# Patient Record
Sex: FEMALE | Race: ASIAN | NOT HISPANIC OR LATINO | Employment: FULL TIME | URBAN - METROPOLITAN AREA
[De-identification: names, ages, dates, MRNs, and addresses within clinical notes are randomized per-mention and may not be internally consistent; named-entity substitution may affect disease eponyms.]

---

## 2017-01-13 ENCOUNTER — OFFICE VISIT (OUTPATIENT)
Dept: OBSTETRICS AND GYNECOLOGY | Facility: CLINIC | Age: 27
End: 2017-01-13
Payer: MEDICAID

## 2017-01-13 VITALS
HEIGHT: 61 IN | SYSTOLIC BLOOD PRESSURE: 108 MMHG | DIASTOLIC BLOOD PRESSURE: 64 MMHG | BODY MASS INDEX: 20.82 KG/M2 | WEIGHT: 110.25 LBS

## 2017-01-13 DIAGNOSIS — Z34.90 PREGNANCY, UNSPECIFIED GESTATIONAL AGE: ICD-10-CM

## 2017-01-13 DIAGNOSIS — Z32.01 PREGNANCY CONFIRMED BY POSITIVE URINE TEST: Primary | ICD-10-CM

## 2017-01-13 DIAGNOSIS — N91.2 AMENORRHEA: ICD-10-CM

## 2017-01-13 PROBLEM — O09.899 SHORT INTERVAL BETWEEN PREGNANCIES COMPLICATING PREGNANCY, ANTEPARTUM: Status: ACTIVE | Noted: 2017-01-13

## 2017-01-13 PROCEDURE — 99204 OFFICE O/P NEW MOD 45 MIN: CPT | Mod: S$PBB,TH,, | Performed by: OBSTETRICS & GYNECOLOGY

## 2017-01-13 PROCEDURE — 99999 PR PBB SHADOW E&M-EST. PATIENT-LVL II: CPT | Mod: PBBFAC,,, | Performed by: OBSTETRICS & GYNECOLOGY

## 2017-01-13 PROCEDURE — 87591 N.GONORRHOEAE DNA AMP PROB: CPT

## 2017-01-13 PROCEDURE — 99212 OFFICE O/P EST SF 10 MIN: CPT | Mod: PBBFAC | Performed by: OBSTETRICS & GYNECOLOGY

## 2017-01-13 NOTE — PROGRESS NOTES
Best Overall Assessment: 01/24/17 EDC: 08/25/17 Assessed GA: 9w4d      PT HERE LMP 11/18/16 AND + UPT. EGA=8 0/7 WEEKS.  STILL BREAST FEEDING AND HAD ONE CYCLE.  NOT TOO TIRED OR WIT N/V.    ROS:  GENERAL: No fever, chills, fatigability or weight loss.  VULVAR: No pain, no lesions and no itching.  VAGINAL: No relaxation, no itching, no discharge, no abnormal bleeding and no lesions.  ABDOMEN: No abdominal pain. Denies nausea. Denies vomiting. No diarrhea. No constipation  BREAST: Denies pain. No lumps. No discharge.  URINARY: No incontinence, no nocturia, no frequency and no dysuria.  CARDIOVASCULAR: No chest pain. No shortness of breath. No leg cramps.  NEUROLOGICAL: No headaches. No vision changes.  The remainder of the review of systems was negative.    PE:  General Appearance: normal weight And Well developed. Well nourished. In no acute distress.  Vulva: Lesions: No.  Urethral Meatus: Normal size. Normal location. No lesions. No prolapse.  Urethra: No masses. No tenderness. No prolapse. No scarring.  Bladder: No masses. No tenderness.  Vagina: Mucosa NI:yes discharge no, atrophy no, cystocele no or rectocele no.  Cervix: Lesion: no  Stenotic: no Cervical motion tenderness: no  Uterus: Uterus size: 6 weeks. Support fair and poor. Uterus size: Normal  Adnexa: Masses: No Tenderness: No CDS Nodularity: No  Abdomen: normal weight No masses. No tenderness.  CHEST: CTA B  HEART: RRR  EXT: NO EDEMA        PROCEDURES:    PLAN:     DIAGNOSIS:  1. Pregnancy confirmed by positive urine test    2. Pregnancy, unspecified gestational age    3. Amenorrhea        MEDICATIONS & ORDERS:  Orders Placed This Encounter    C. trachomatis/N. gonorrhoeae by AMP DNA Cervix    US OB/GYN Procedure ([facility])    US MFM Procedure ([facility])         FOLLOW-UP: With me in 1 month  LABS NEXT APPOINTMENT

## 2017-01-17 LAB
C TRACH DNA SPEC QL NAA+PROBE: NEGATIVE
N GONORRHOEA DNA SPEC QL NAA+PROBE: NEGATIVE

## 2017-01-24 ENCOUNTER — PROCEDURE VISIT (OUTPATIENT)
Dept: OBSTETRICS AND GYNECOLOGY | Facility: CLINIC | Age: 27
End: 2017-01-24
Payer: MEDICAID

## 2017-01-24 DIAGNOSIS — Z34.90 PREGNANCY, UNSPECIFIED GESTATIONAL AGE: ICD-10-CM

## 2017-01-24 PROCEDURE — 76801 OB US < 14 WKS SINGLE FETUS: CPT | Mod: 26,S$PBB,, | Performed by: OBSTETRICS & GYNECOLOGY

## 2017-01-24 PROCEDURE — 76801 OB US < 14 WKS SINGLE FETUS: CPT | Mod: PBBFAC | Performed by: OBSTETRICS & GYNECOLOGY

## 2017-02-17 ENCOUNTER — OFFICE VISIT (OUTPATIENT)
Dept: MATERNAL FETAL MEDICINE | Facility: CLINIC | Age: 27
End: 2017-02-17
Payer: MEDICAID

## 2017-02-17 ENCOUNTER — LAB VISIT (OUTPATIENT)
Dept: LAB | Facility: OTHER | Age: 27
End: 2017-02-17
Attending: OBSTETRICS & GYNECOLOGY
Payer: MEDICAID

## 2017-02-17 VITALS — WEIGHT: 112.63 LBS | BODY MASS INDEX: 21.29 KG/M2

## 2017-02-17 DIAGNOSIS — Z36.82 ENCOUNTER FOR (NT) NUCHAL TRANSLUCENCY SCAN: ICD-10-CM

## 2017-02-17 DIAGNOSIS — Z34.90 PREGNANCY, UNSPECIFIED GESTATIONAL AGE: ICD-10-CM

## 2017-02-17 DIAGNOSIS — Z36.89 ENCOUNTER FOR FETAL ANATOMIC SURVEY: Primary | ICD-10-CM

## 2017-02-17 PROCEDURE — 81508 FTL CGEN ABNOR TWO PROTEINS: CPT

## 2017-02-17 PROCEDURE — 76801 OB US < 14 WKS SINGLE FETUS: CPT | Mod: 26,S$PBB,, | Performed by: PEDIATRICS

## 2017-02-17 PROCEDURE — 76813 OB US NUCHAL MEAS 1 GEST: CPT | Mod: 26,S$PBB,, | Performed by: PEDIATRICS

## 2017-02-17 PROCEDURE — 99499 UNLISTED E&M SERVICE: CPT | Mod: S$PBB,,, | Performed by: PEDIATRICS

## 2017-02-17 PROCEDURE — 36415 COLL VENOUS BLD VENIPUNCTURE: CPT

## 2017-02-17 NOTE — PROGRESS NOTES
Indication: Nuchal Translucency (KENTRELL SMITH).   Maternal age (27 years).   ____________________________________________________________________________  History: Age: 27 years.  ____________________________________________________________________________  Dating:  LMP: 11/18/16 EDC: 08/25/17 GA by LMP: 13w0d  Current Scan on: 02/17/17 EDC: 08/27/17 GA by current scan: 12w5d  Best Overall Assessment: 02/17/17 EDC: 08/25/17 Assessed GA: 13w0d  The calculation of the gestational age by current scan was based on CRL.  The Best Overall Assessment is based on the LMP.  ____________________________________________________________________________  Maternal Characteristics and History:  Maternal age 27 years. Parity (pregnancies after 23 weeks). Current maternal weight 51.1 kg  ____________________________________________________________________________  First Trimester Scan:  Arreola gestation.    Biometry:  CRL 63.0 mm 22nd% 12w5d (12w1d to 13w1d)  NT 1.40 mm  Additional Markers for Aneuploidies: Nasal bone present.  Fetal Anatomy:  Skull / Brain: appears normal. Abdomen: appears normal. Stomach: visible. Bladder: visible. Hands: both visible. Feet: both visible.      Fetal heart activity: present. Fetal heart rate: 154 bpm.   Amniotic fluid: normal.   Cord: 3 vessels.       Summary of Ultrasound Findings:  Transabdominal US. U/S machine: Memoright.       Impression: normal intrauterine pregnancy.     Comments: Size agrees with dates.     ____________________________________________________________________________  Maternal Structures:    Uterus: normal, anteverted.    Right Ovary: normal.   Right Ovary size: 22 mm x 16 mm x 9 mm. Volume: 1.7 ml.   Left Ovary: normal.   Left Ovary size: 33 mm x 20 mm x 15 mm. Volume: 5.2 ml.  Cul de Sac / Pouch of Raffy: no free fluid visible.  ____________________________________________________________________________  Maternal Assessment:    Followup examination.   Weight:  112 lb (51.1 kg).   ____________________________________________________________________________  Report Summary:      Impression:     Single viable intrauterine pregnancy consistent with established dating.  Fetus grossly WNL with normal cardiac activity.    No evidence of nuchal translucency thickening visualized today.    Normal uterus, cervix and adnexae as noted above.  No fluid seen in cul-de-sac.     Recommendations:     First portion of sequential screening completed today. Results to be sent to primary pregnancy care provider. Recommend to complete second blood draw beyond 15 weeks EGA of pregnancy. Ultrasound for fetal anatomical survey scheduled by MFM today for 19-20 weeks EGA.     Thank you for allowing us to participate in the care of your patients.  If you have any questions concerning today's consultation, please feel free to contact me or one of my partners.  We can be reached at [phone] during normal business hours.  If you have a question after normal business hours, please contact Labor and Delivery [phone] and the unit secretary will page our on call physician.

## 2017-02-20 LAB
B-HCG (M.O.M.) (SS1): NORMAL
CRL MEASUREMENT (SS1): 63 MM
DOWN SYNDROME (<1:25) (SS1): NORMAL
DS BASED ON MAT. AGE (SS1): NORMAL
ETHNIC ORIGIN (SS1): NORMAL
GESTATIONAL AGE (DAYS)(SS1): 5
GESTATIONAL AGE (WEEKS)(SS1): 12
HCG (SS1): 122.6 IU/ML
INSULIN DEPEND. DIABETES (SS1): NORMAL
MATERNAL AGE AT EDD (YRS) (SS1): 29
MATERNAL WEIGHT (LBS) (SS1): 112
MULTIPLE GESTATION (SS1): NORMAL
NASAL BONE (SS1): NORMAL
NUCHAL TRANSL. (M.O.M.) (SS1): NORMAL
NUCHAL TRANSLUCENCY (SS1): 1.4 MM
PAPP-A (M.O.M.) (SS1): NORMAL
PAPP-A (SS1): NORMAL NG/ML
SEQ. SCREEN PART 1: NEGATIVE
SEQUENTIAL SCREEN I INTERP.: NORMAL
TRISOMY 18 (<1:100) (SS1): NORMAL
ULTRASOUND DATE (SS1): NORMAL

## 2017-03-31 ENCOUNTER — OFFICE VISIT (OUTPATIENT)
Dept: MATERNAL FETAL MEDICINE | Facility: CLINIC | Age: 27
End: 2017-03-31
Payer: MEDICAID

## 2017-03-31 VITALS — BODY MASS INDEX: 22.62 KG/M2 | WEIGHT: 119.69 LBS

## 2017-03-31 DIAGNOSIS — O35.BXX0 ECHOGENIC FOCUS OF HEART OF FETUS AFFECTING ANTEPARTUM CARE OF MOTHER, SINGLE GESTATION: ICD-10-CM

## 2017-03-31 DIAGNOSIS — Z36.89 ENCOUNTER FOR FETAL ANATOMIC SURVEY: ICD-10-CM

## 2017-03-31 DIAGNOSIS — Z36.82 ENCOUNTER FOR (NT) NUCHAL TRANSLUCENCY SCAN: Primary | ICD-10-CM

## 2017-03-31 PROCEDURE — 99499 UNLISTED E&M SERVICE: CPT | Mod: S$PBB,,, | Performed by: PEDIATRICS

## 2017-03-31 PROCEDURE — 76811 OB US DETAILED SNGL FETUS: CPT | Mod: PBBFAC | Performed by: PEDIATRICS

## 2017-03-31 PROCEDURE — 76811 OB US DETAILED SNGL FETUS: CPT | Mod: 26,S$PBB,, | Performed by: PEDIATRICS

## 2017-03-31 NOTE — PROGRESS NOTES
Indication  ========    Fetal anatomy survey.    History  ======    General History  Other: KENTRELL SMITH  Previous Outcomes   3  Para 2    Pregnancy History  ==============    Maternal Lab Tests  Test: Sequential Screen  Result: Pending results  Wants to know gender: yes    Method  ======    Transabdominal ultrasound examination. View: Good view.    Pregnancy  =========    Arreola pregnancy. Number of fetuses: 1.    Dating  ======    LMP on: 2016  Cycle: regular cycle  GA by LMP 19 w + 0 d  RISHI by LMP: 2017  Ultrasound examination on: 3/31/2017  GA by U/S based upon: AC, BPD, Femur, HC  GA by U/S 19 w + 0 d  RISHI by U/S: 2017  Assigned: The Best Overall Assessment is based on the LMP.  Assigned GA 19 w + 0 d  Assigned RISHI: 2017    General Evaluation  ==============    Cardiac activity: present.  bpm.  Fetal movements: visualized.  Presentation: cephalic.  Placenta:  Placental site: anterior.  Umbilical cord: Cord vessels: 3 vessel cord. Cord insertion: placental insertion: normal.  Amniotic fluid: Amount of AF: normal amount.    Fetal Biometry  ============    Fetal Biometry  BPD 42.9 mm 50% 19w 0d Hadlock  OFD 54.6 mm 65% 19w 3d Charity  .1 mm 34% 18w 6d Hadlock  .1 mm 45% 19w 0d Hadlock  Femur 29.7 mm 49% 19w 1d Hadlock  Cerebellum tr 18.2 mm 15% 18w 4d Cote  CM 3.2 mm 7% Nicolaides  Nuchal fold 3.93 mm  Humerus 27.5 mm 42% 18w 5d Charity   g 47% 19w 0d Hadlock  Calculated by: Hadlock (BPD-HC-AC-FL)  EFW (lb) 0 lb  EFW (oz) 10 oz  Cephalic index 0.79 45% Nicolaides  HC / AC 1.19 58% Hadlock  FL / BPD 0.69 65% Hadlock  FL / AC 0.22 72% Hadlock   bpm    Fetal Anatomy  ============    Cranium: normal  Lateral ventricles: normal  Choroid plexus: normal  Midline falx: normal  Cavum septi pellucidi: normal  Cerebellum: normal  Cisterna magna: normal  Neck: appears normal  Nuchal  fold: normal  Lips: normal  Profile: normal  Nose: normal  RVOT: normal  LVOT: normal  4-chamber view: 4-chamber normal, septum normal  Situs: normal  Aortic arch: normal  Ductal arch: normal  SVC: normal  IVC: normal  3-vessel view: normal  3-vessel-trachea view: normal  Cardiac axis: normal  Cardiac size: normal  Cardiac rhythm: normal  Rt lung: normal  Lt lung: normal  Diaphragm: normal  Cord insertion: normal  Stomach: normal  Kidneys: normal  Bladder: normal  Abdom. wall: normal  Rt kidney: normal  Lt kidney: normal  Liver: normal  Rt renal artery: normal  Lt renal artery: normal  Cervical spine: normal  Thoracic spine: normal  Lumbar spine: normal  Sacral spine: normal  Rt hand: normal  Lt hand: normal  Rt foot: normal  Lt foot: normal  Gender: female  Wants to know gender: yes  Other: both hands visualized partially open    Maternal Structures  ===============    Uterus / Cervix  Uterus: Normal  Cervical length 32.2 mm  Ovaries / Tubes / Adnexa  Rt ovary: Not visualized  Lt ovary: Visualized  Pouch of Raffy / Other Structures  Cul de Sac: Visualized          Impression  =========    Normal fetal anatomy with no obvious abnormalities.    Biometry is consistent with dating.    Normal amniotic fluid volume per qualitative assessment.  Normal placental location without evidence of previa.    Normal appearing cervical length per trans-abdominal screening.      An echogenic intracardiac focus (EIF) was identified on ultrasound today. A detailed fetal anatomic ultrasound exam was performed, and no  fetal structural malformations or other sonographic soft markers associated with Down Syndrome were seen. The patient is at low risk for Down  Syndrome based on age, family hx, and prior screening. Given these factors, the risk for Down Syndrome is low and is not appreciably altered  from the patients age-associated or screening-adjusted risk. Therefore, further screening or testing is not recommended nor was  counseling for  this finding performed.  EIFs occur in 5 - 15% of normal fetuses and are not associated with structural or functional cardiac abnormalities. Thus, no further   sonographic evaluation nor  cardiac evaluation is recommended.  If either the patient or referring MD desires more information regarding this finding, a consultation with DAVID can be scheduled. Please contact  our office if you wish to schedule a consultation.          Recommendation  ==============    Suggest repeat scan as you feel clinically indicated.    Thank you for allowing us to participate in the care of your patients. If you have any questions concerning today's consultation feel free to  contact me or one of my partners. We can be reached at [phone] during normal business hours. If you have a question after normal  business hours, please contact Labor and Delivery [phone] and the unit secretary will page our on call physician.

## 2017-04-27 ENCOUNTER — ROUTINE PRENATAL (OUTPATIENT)
Dept: OBSTETRICS AND GYNECOLOGY | Facility: CLINIC | Age: 27
End: 2017-04-27
Payer: MEDICAID

## 2017-04-27 ENCOUNTER — LAB VISIT (OUTPATIENT)
Dept: LAB | Facility: OTHER | Age: 27
End: 2017-04-27
Attending: OBSTETRICS & GYNECOLOGY
Payer: MEDICAID

## 2017-04-27 VITALS
WEIGHT: 125.25 LBS | DIASTOLIC BLOOD PRESSURE: 56 MMHG | BODY MASS INDEX: 23.66 KG/M2 | SYSTOLIC BLOOD PRESSURE: 100 MMHG

## 2017-04-27 DIAGNOSIS — Z3A.22 22 WEEKS GESTATION OF PREGNANCY: Primary | ICD-10-CM

## 2017-04-27 DIAGNOSIS — O09.892 SHORT INTERVAL BETWEEN PREGNANCIES COMPLICATING PREGNANCY, ANTEPARTUM, SECOND TRIMESTER: ICD-10-CM

## 2017-04-27 DIAGNOSIS — Z3A.22 22 WEEKS GESTATION OF PREGNANCY: ICD-10-CM

## 2017-04-27 DIAGNOSIS — O35.BXX0 ECHOGENIC FOCUS OF HEART OF FETUS AFFECTING ANTEPARTUM CARE OF MOTHER, SINGLE GESTATION: ICD-10-CM

## 2017-04-27 LAB
ABO + RH BLD: NORMAL
BLD GP AB SCN CELLS X3 SERPL QL: NORMAL

## 2017-04-27 PROCEDURE — 36415 COLL VENOUS BLD VENIPUNCTURE: CPT

## 2017-04-27 PROCEDURE — 87340 HEPATITIS B SURFACE AG IA: CPT

## 2017-04-27 PROCEDURE — 86703 HIV-1/HIV-2 1 RESULT ANTBDY: CPT

## 2017-04-27 PROCEDURE — 99213 OFFICE O/P EST LOW 20 MIN: CPT | Mod: TH,S$PBB,, | Performed by: OBSTETRICS & GYNECOLOGY

## 2017-04-27 PROCEDURE — 86592 SYPHILIS TEST NON-TREP QUAL: CPT

## 2017-04-27 PROCEDURE — 86900 BLOOD TYPING SEROLOGIC ABO: CPT

## 2017-04-27 PROCEDURE — 86762 RUBELLA ANTIBODY: CPT

## 2017-04-27 PROCEDURE — 99999 PR PBB SHADOW E&M-EST. PATIENT-LVL I: CPT | Mod: PBBFAC,,, | Performed by: OBSTETRICS & GYNECOLOGY

## 2017-04-27 PROCEDURE — 86901 BLOOD TYPING SEROLOGIC RH(D): CPT

## 2017-04-28 LAB
HBV SURFACE AG SERPL QL IA: NEGATIVE
HIV 1+2 AB+HIV1 P24 AG SERPL QL IA: NEGATIVE
RPR SER QL: NORMAL
RUBV IGG SER-ACNC: 17.7 IU/ML
RUBV IGG SER-IMP: REACTIVE

## 2017-05-25 ENCOUNTER — ROUTINE PRENATAL (OUTPATIENT)
Dept: OBSTETRICS AND GYNECOLOGY | Facility: CLINIC | Age: 27
End: 2017-05-25
Payer: MEDICAID

## 2017-05-25 ENCOUNTER — LAB VISIT (OUTPATIENT)
Dept: LAB | Facility: OTHER | Age: 27
End: 2017-05-25
Attending: OBSTETRICS & GYNECOLOGY
Payer: MEDICAID

## 2017-05-25 VITALS
WEIGHT: 130.94 LBS | DIASTOLIC BLOOD PRESSURE: 60 MMHG | BODY MASS INDEX: 24.74 KG/M2 | SYSTOLIC BLOOD PRESSURE: 102 MMHG

## 2017-05-25 DIAGNOSIS — Z3A.26 26 WEEKS GESTATION OF PREGNANCY: Primary | ICD-10-CM

## 2017-05-25 DIAGNOSIS — O09.892 SHORT INTERVAL BETWEEN PREGNANCIES COMPLICATING PREGNANCY, ANTEPARTUM, SECOND TRIMESTER: ICD-10-CM

## 2017-05-25 DIAGNOSIS — Z3A.22 22 WEEKS GESTATION OF PREGNANCY: ICD-10-CM

## 2017-05-25 LAB
BASOPHILS # BLD AUTO: 0.01 K/UL
BASOPHILS NFR BLD: 0.1 %
DIFFERENTIAL METHOD: ABNORMAL
EOSINOPHIL # BLD AUTO: 0.1 K/UL
EOSINOPHIL NFR BLD: 1.5 %
ERYTHROCYTE [DISTWIDTH] IN BLOOD BY AUTOMATED COUNT: 13.8 %
GLUCOSE SERPL-MCNC: 93 MG/DL
HCT VFR BLD AUTO: 33.4 %
HGB BLD-MCNC: 11 G/DL
LYMPHOCYTES # BLD AUTO: 1.8 K/UL
LYMPHOCYTES NFR BLD: 20.5 %
MCH RBC QN AUTO: 31 PG
MCHC RBC AUTO-ENTMCNC: 32.9 %
MCV RBC AUTO: 94 FL
MONOCYTES # BLD AUTO: 0.6 K/UL
MONOCYTES NFR BLD: 6.8 %
NEUTROPHILS # BLD AUTO: 6.2 K/UL
NEUTROPHILS NFR BLD: 70.3 %
PLATELET # BLD AUTO: 193 K/UL
PMV BLD AUTO: 10.9 FL
RBC # BLD AUTO: 3.55 M/UL
WBC # BLD AUTO: 8.78 K/UL

## 2017-05-25 PROCEDURE — 99213 OFFICE O/P EST LOW 20 MIN: CPT | Mod: TH,S$PBB,, | Performed by: OBSTETRICS & GYNECOLOGY

## 2017-05-25 PROCEDURE — 99999 PR PBB SHADOW E&M-EST. PATIENT-LVL I: CPT | Mod: PBBFAC,,, | Performed by: OBSTETRICS & GYNECOLOGY

## 2017-05-25 PROCEDURE — 99211 OFF/OP EST MAY X REQ PHY/QHP: CPT | Mod: PBBFAC | Performed by: OBSTETRICS & GYNECOLOGY

## 2017-06-23 ENCOUNTER — CLINICAL SUPPORT (OUTPATIENT)
Dept: OBSTETRICS AND GYNECOLOGY | Facility: CLINIC | Age: 27
End: 2017-06-23
Payer: MEDICAID

## 2017-06-23 ENCOUNTER — ROUTINE PRENATAL (OUTPATIENT)
Dept: OBSTETRICS AND GYNECOLOGY | Facility: CLINIC | Age: 27
End: 2017-06-23
Payer: MEDICAID

## 2017-06-23 VITALS
WEIGHT: 136.69 LBS | BODY MASS INDEX: 25.83 KG/M2 | DIASTOLIC BLOOD PRESSURE: 60 MMHG | SYSTOLIC BLOOD PRESSURE: 110 MMHG

## 2017-06-23 DIAGNOSIS — D50.9 IRON DEFICIENCY ANEMIA, UNSPECIFIED IRON DEFICIENCY ANEMIA TYPE: ICD-10-CM

## 2017-06-23 DIAGNOSIS — Z23 NEED FOR TDAP VACCINATION: Primary | ICD-10-CM

## 2017-06-23 DIAGNOSIS — Z3A.31 31 WEEKS GESTATION OF PREGNANCY: Primary | ICD-10-CM

## 2017-06-23 DIAGNOSIS — O35.BXX0 ECHOGENIC FOCUS OF HEART OF FETUS AFFECTING ANTEPARTUM CARE OF MOTHER, SINGLE GESTATION: ICD-10-CM

## 2017-06-23 DIAGNOSIS — O09.892 SHORT INTERVAL BETWEEN PREGNANCIES COMPLICATING PREGNANCY, ANTEPARTUM, SECOND TRIMESTER: ICD-10-CM

## 2017-06-23 PROCEDURE — 99213 OFFICE O/P EST LOW 20 MIN: CPT | Mod: S$PBB,TH,, | Performed by: OBSTETRICS & GYNECOLOGY

## 2017-06-23 PROCEDURE — 99999 PR PBB SHADOW E&M-EST. PATIENT-LVL I: CPT | Mod: PBBFAC,,, | Performed by: OBSTETRICS & GYNECOLOGY

## 2017-06-23 PROCEDURE — 99211 OFF/OP EST MAY X REQ PHY/QHP: CPT | Mod: PBBFAC | Performed by: OBSTETRICS & GYNECOLOGY

## 2017-06-23 NOTE — PROGRESS NOTES
Here for TDAP injection. Patient without complaint of pain at this time ,Injection given. Tolerated well. No pain noted after injection.  Advised to wait in lobby 15 minutes and report any adverse reactions.         Site: LD

## 2017-07-18 ENCOUNTER — ROUTINE PRENATAL (OUTPATIENT)
Dept: OBSTETRICS AND GYNECOLOGY | Facility: CLINIC | Age: 27
End: 2017-07-18
Payer: MEDICAID

## 2017-07-18 VITALS
DIASTOLIC BLOOD PRESSURE: 60 MMHG | BODY MASS INDEX: 27.08 KG/M2 | SYSTOLIC BLOOD PRESSURE: 100 MMHG | WEIGHT: 143.31 LBS

## 2017-07-18 DIAGNOSIS — Z3A.34 34 WEEKS GESTATION OF PREGNANCY: Primary | ICD-10-CM

## 2017-07-18 PROCEDURE — 99999 PR PBB SHADOW E&M-EST. PATIENT-LVL I: CPT | Mod: PBBFAC,,, | Performed by: OBSTETRICS & GYNECOLOGY

## 2017-07-18 PROCEDURE — 99211 OFF/OP EST MAY X REQ PHY/QHP: CPT | Mod: PBBFAC | Performed by: OBSTETRICS & GYNECOLOGY

## 2017-07-18 PROCEDURE — 99213 OFFICE O/P EST LOW 20 MIN: CPT | Mod: TH,S$PBB,, | Performed by: OBSTETRICS & GYNECOLOGY
